# Patient Record
Sex: FEMALE | Race: WHITE | NOT HISPANIC OR LATINO | Employment: OTHER | URBAN - METROPOLITAN AREA
[De-identification: names, ages, dates, MRNs, and addresses within clinical notes are randomized per-mention and may not be internally consistent; named-entity substitution may affect disease eponyms.]

---

## 2017-11-21 ENCOUNTER — OFFICE VISIT (OUTPATIENT)
Dept: OPHTHALMOLOGY | Facility: CLINIC | Age: 62
End: 2017-11-21
Payer: COMMERCIAL

## 2017-11-21 DIAGNOSIS — E11.9 TYPE 2 DIABETES MELLITUS WITHOUT RETINOPATHY: Primary | ICD-10-CM

## 2017-11-21 DIAGNOSIS — H52.03 HYPEROPIA OF BOTH EYES WITH ASTIGMATISM AND PRESBYOPIA: ICD-10-CM

## 2017-11-21 DIAGNOSIS — Z13.5 SCREENING FOR GLAUCOMA: ICD-10-CM

## 2017-11-21 DIAGNOSIS — H52.4 HYPEROPIA OF BOTH EYES WITH ASTIGMATISM AND PRESBYOPIA: ICD-10-CM

## 2017-11-21 DIAGNOSIS — H52.203 HYPEROPIA OF BOTH EYES WITH ASTIGMATISM AND PRESBYOPIA: ICD-10-CM

## 2017-11-21 PROCEDURE — 92015 DETERMINE REFRACTIVE STATE: CPT | Mod: S$GLB,,, | Performed by: OPTOMETRIST

## 2017-11-21 PROCEDURE — 99999 PR PBB SHADOW E&M-EST. PATIENT-LVL II: CPT | Mod: PBBFAC,,, | Performed by: OPTOMETRIST

## 2017-11-21 PROCEDURE — 92014 COMPRE OPH EXAM EST PT 1/>: CPT | Mod: S$GLB,,, | Performed by: OPTOMETRIST

## 2017-11-21 RX ORDER — PEN NEEDLE, DIABETIC 32GX 5/32"
NEEDLE, DISPOSABLE MISCELLANEOUS
COMMUNITY
Start: 2017-11-02

## 2017-11-21 RX ORDER — LIRAGLUTIDE 6 MG/ML
INJECTION SUBCUTANEOUS
COMMUNITY
Start: 2017-11-02

## 2017-11-21 RX ORDER — AMOXICILLIN 250 MG/1
CAPSULE ORAL
COMMUNITY
Start: 2017-08-22 | End: 2018-11-27

## 2017-11-21 RX ORDER — OXYCODONE AND ACETAMINOPHEN 5; 325 MG/1; MG/1
TABLET ORAL
COMMUNITY
Start: 2017-08-22 | End: 2018-11-27

## 2017-11-21 RX ORDER — METHYLPREDNISOLONE 4 MG/1
TABLET ORAL
COMMUNITY
Start: 2017-08-22 | End: 2018-11-27

## 2017-11-21 RX ORDER — LEVOTHYROXINE SODIUM 25 UG/1
TABLET ORAL
COMMUNITY
Start: 2017-08-31

## 2017-11-21 RX ORDER — NITROFURANTOIN 25; 75 MG/1; MG/1
CAPSULE ORAL
COMMUNITY
Start: 2017-09-27 | End: 2018-11-27

## 2017-11-21 RX ORDER — DIAZEPAM 10 MG/1
TABLET ORAL
COMMUNITY
Start: 2017-09-06 | End: 2018-11-27

## 2017-11-21 RX ORDER — FLUOCINONIDE TOPICAL SOLUTION USP, 0.05% 0.5 MG/ML
SOLUTION TOPICAL
COMMUNITY
Start: 2017-10-11 | End: 2018-11-27

## 2017-11-21 NOTE — PROGRESS NOTES
HPI     Diabetic Eye Exam    Additional comments: Yearly           Comments   [place] [facility] [place] DM exam. Patient here today for   yearly DM exam.  1. DM  HPI    Any vision changes since last exam: Yes  Eye pain: No  Other ocular symptoms: No    Do you wear currently wear glasses or contacts? Glasses    Interested in contacts today? No    Do you plan on getting new glasses today? Yes  Diabetic eye exam  Diagnosed with diabetes 10+ years ago  Recent vision fluctuations No  Blood sugar: 142 today         Last edited by Shauna Diaz, PCT on 11/21/2017  2:13 PM.   (History)            Assessment /Plan     For exam results, see Encounter Report.    Type 2 diabetes mellitus without retinopathy    Screening for glaucoma    Hyperopia of both eyes with astigmatism and presbyopia      No diabetic retinopathy both eyes.  Glaucoma screening negative both eyes.  Updated glasses prescription.  Return to clinic 1 yr.

## 2018-11-27 ENCOUNTER — OFFICE VISIT (OUTPATIENT)
Dept: OPHTHALMOLOGY | Facility: CLINIC | Age: 63
End: 2018-11-27
Payer: COMMERCIAL

## 2018-11-27 DIAGNOSIS — H52.203 HYPEROPIA OF BOTH EYES WITH ASTIGMATISM AND PRESBYOPIA: ICD-10-CM

## 2018-11-27 DIAGNOSIS — H52.03 HYPEROPIA OF BOTH EYES WITH ASTIGMATISM AND PRESBYOPIA: ICD-10-CM

## 2018-11-27 DIAGNOSIS — H52.4 HYPEROPIA OF BOTH EYES WITH ASTIGMATISM AND PRESBYOPIA: ICD-10-CM

## 2018-11-27 DIAGNOSIS — E11.59 HYPERTENSION ASSOCIATED WITH DIABETES: ICD-10-CM

## 2018-11-27 DIAGNOSIS — Z13.5 SCREENING FOR GLAUCOMA: ICD-10-CM

## 2018-11-27 DIAGNOSIS — E11.9 TYPE 2 DIABETES MELLITUS WITHOUT RETINOPATHY: Primary | ICD-10-CM

## 2018-11-27 DIAGNOSIS — I15.2 HYPERTENSION ASSOCIATED WITH DIABETES: ICD-10-CM

## 2018-11-27 PROBLEM — R42 VERTIGO: Status: ACTIVE | Noted: 2018-11-27

## 2018-11-27 PROBLEM — H92.09 OTALGIA: Status: ACTIVE | Noted: 2018-11-27

## 2018-11-27 PROBLEM — E66.9 OBESITY (BMI 30.0-34.9): Status: ACTIVE | Noted: 2018-07-27

## 2018-11-27 PROBLEM — M26.609 TEMPOROMANDIBULAR JOINT DISORDER: Status: ACTIVE | Noted: 2018-11-27

## 2018-11-27 PROCEDURE — 99999 PR PBB SHADOW E&M-EST. PATIENT-LVL II: CPT | Mod: PBBFAC,,, | Performed by: OPTOMETRIST

## 2018-11-27 PROCEDURE — 92015 DETERMINE REFRACTIVE STATE: CPT | Mod: S$GLB,,, | Performed by: OPTOMETRIST

## 2018-11-27 PROCEDURE — 92014 COMPRE OPH EXAM EST PT 1/>: CPT | Mod: S$GLB,,, | Performed by: OPTOMETRIST

## 2018-11-27 NOTE — PROGRESS NOTES
HPI     Diabetic Eye Exam      Additional comments: Yearly              Comments     Last eye exam with [facility] 11/21/17 for yearly eye exam  Patient here today for yearly DM eye exam  No noticeable changes in vision since last eye exam  Wears PAL glasses full-time  Blood sugar stable does not check it all the time  A1C is 5.1  No other complaints  No drops  1. DM          Last edited by Shauna Diaz, PCT on 11/27/2018 10:05 AM.   (History)            Assessment /Plan     For exam results, see Encounter Report.    Type 2 diabetes mellitus without retinopathy    Hypertension associated with diabetes    Screening for glaucoma    Hyperopia of both eyes with astigmatism and presbyopia      No diabetic or hypertensive retinopathy in either eye.  Glaucoma screening negative both eyes.  Stable refractive error.  Updated glasses prescription.  Return to clinic 1 yr.

## 2021-10-05 RX ORDER — POTASSIUM CHLORIDE 750 MG/1
CAPSULE, EXTENDED RELEASE ORAL
COMMUNITY
Start: 2020-08-06

## 2021-10-05 RX ORDER — AZITHROMYCIN 250 MG/1
TABLET, FILM COATED ORAL
COMMUNITY
Start: 2020-08-02

## 2021-10-05 RX ORDER — ESOMEPRAZOLE MAGNESIUM 40 MG/1
CAPSULE, DELAYED RELEASE ORAL
COMMUNITY

## 2021-10-05 RX ORDER — CODEINE PHOSPHATE AND GUAIFENESIN 10; 100 MG/5ML; MG/5ML
5 SOLUTION ORAL 3 TIMES DAILY PRN
COMMUNITY
Start: 2020-08-02

## 2022-10-18 PROBLEM — E78.5 HYPERLIPIDEMIA ASSOCIATED WITH TYPE 2 DIABETES MELLITUS: Status: ACTIVE | Noted: 2020-02-24

## 2022-10-18 PROBLEM — E11.69 HYPERLIPIDEMIA ASSOCIATED WITH TYPE 2 DIABETES MELLITUS: Status: ACTIVE | Noted: 2020-02-24

## 2023-05-10 ENCOUNTER — TELEPHONE (OUTPATIENT)
Dept: UROLOGY | Facility: CLINIC | Age: 68
End: 2023-05-10
Payer: MEDICARE

## 2023-05-10 DIAGNOSIS — R42 VERTIGO: Primary | ICD-10-CM

## 2023-05-10 NOTE — TELEPHONE ENCOUNTER
----- Message from Rashmi Guerrero sent at 5/10/2023  4:45 PM CDT -----  Contact: Patti Escoto is calling in regards to having UTI and would like to schedule an appt. Please call back at [phone].        Thanks  KT

## 2023-05-11 ENCOUNTER — LAB VISIT (OUTPATIENT)
Dept: LAB | Facility: HOSPITAL | Age: 68
End: 2023-05-11
Attending: UROLOGY
Payer: MEDICARE

## 2023-05-11 DIAGNOSIS — R42 VERTIGO: ICD-10-CM

## 2023-05-11 PROCEDURE — 87086 URINE CULTURE/COLONY COUNT: CPT | Performed by: UROLOGY

## 2023-05-13 LAB — BACTERIA UR CULT: NORMAL
